# Patient Record
Sex: MALE | Race: WHITE | NOT HISPANIC OR LATINO | Employment: OTHER | URBAN - METROPOLITAN AREA
[De-identification: names, ages, dates, MRNs, and addresses within clinical notes are randomized per-mention and may not be internally consistent; named-entity substitution may affect disease eponyms.]

---

## 2018-06-24 ENCOUNTER — HOSPITAL ENCOUNTER (EMERGENCY)
Facility: HOSPITAL | Age: 33
Discharge: HOME | End: 2018-06-24
Attending: EMERGENCY MEDICINE
Payer: MEDICAID

## 2018-06-24 VITALS
HEART RATE: 81 BPM | TEMPERATURE: 99 F | OXYGEN SATURATION: 98 % | BODY MASS INDEX: 30.36 KG/M2 | WEIGHT: 205 LBS | HEIGHT: 69 IN | RESPIRATION RATE: 16 BRPM | SYSTOLIC BLOOD PRESSURE: 145 MMHG | DIASTOLIC BLOOD PRESSURE: 92 MMHG

## 2018-06-24 DIAGNOSIS — K08.89 PAIN, DENTAL: Primary | ICD-10-CM

## 2018-06-24 PROCEDURE — 99283 EMERGENCY DEPT VISIT LOW MDM: CPT

## 2018-06-24 PROCEDURE — 25000003 PHARM REV CODE 250: Performed by: PHYSICIAN ASSISTANT

## 2018-06-24 RX ORDER — TRAMADOL HYDROCHLORIDE 50 MG/1
50 TABLET ORAL EVERY 8 HOURS PRN
Qty: 5 TABLET | Refills: 0 | Status: SHIPPED | OUTPATIENT
Start: 2018-06-24 | End: 2018-06-26

## 2018-06-24 RX ORDER — HYDROCODONE BITARTRATE AND ACETAMINOPHEN 5; 325 MG/1; MG/1
1 TABLET ORAL EVERY 6 HOURS PRN
COMMUNITY

## 2018-06-24 RX ORDER — DEXTROAMPHETAMINE SACCHARATE, AMPHETAMINE ASPARTATE MONOHYDRATE, DEXTROAMPHETAMINE SULFATE AND AMPHETAMINE SULFATE 7.5; 7.5; 7.5; 7.5 MG/1; MG/1; MG/1; MG/1
30 CAPSULE, EXTENDED RELEASE ORAL EVERY MORNING
COMMUNITY

## 2018-06-24 RX ORDER — HYDROCODONE BITARTRATE AND ACETAMINOPHEN 5; 325 MG/1; MG/1
1 TABLET ORAL
Status: COMPLETED | OUTPATIENT
Start: 2018-06-24 | End: 2018-06-24

## 2018-06-24 RX ADMIN — HYDROCODONE BITARTRATE AND ACETAMINOPHEN 1 TABLET: 5; 325 TABLET ORAL at 12:06

## 2018-06-24 NOTE — ED NOTES
Patient presents to ed with c/o dental pain. Reports recent dental work on upper right jaw. Was seen at urgent care on Friday for similar symptoms and was given NORCo and antibiotics. No obvious abscess noted. Mild swelling noted to right side of face.

## 2018-06-24 NOTE — ED PROVIDER NOTES
Encounter Date: 6/24/2018       History     Chief Complaint   Patient presents with    Dental Problem     Reports dental abscess to R side, seen at  and given [place] and Abx, reports pain remians and still swollen     32 yo male presents to the ED complaining of right sided dental pain x 1 week. Patient was seen at  2 days ago and given Augmentin and norco and instructed to follow up with his dentist. Patient states the pain has not improved. States he has radiation of pain to his right ear and jaw. Patient states he has hx of tooth decay. Denies fever, sore throat.       The history is provided by the patient. No  was used.     Review of patient's allergies indicates:  No Known Allergies  Past Medical History:   Diagnosis Date    ADHD      History reviewed. No pertinent surgical history.  History reviewed. No pertinent family history.  Social History   Substance Use Topics    Smoking status: Never Smoker    Smokeless tobacco: Never Used    Alcohol use Not on file     Review of Systems   Constitutional: Negative for appetite change and fever.   HENT: Positive for dental problem. Negative for sore throat and trouble swallowing.    Respiratory: Negative for shortness of breath.    Cardiovascular: Negative for chest pain.   Gastrointestinal: Negative for nausea.   Genitourinary: Negative for dysuria.   Musculoskeletal: Negative for back pain.   Skin: Negative for rash.   Neurological: Negative for weakness and headaches.   Hematological: Does not bruise/bleed easily.   Psychiatric/Behavioral: Negative for confusion.   All other systems reviewed and are negative.      Physical Exam     Initial Vitals   BP Pulse Resp Temp SpO2   06/24/18 1229 06/24/18 1229 06/24/18 1229 06/24/18 1407 06/24/18 1229   (!) 145/92 81 16 98.6 °F (37 °C) 98 %      MAP       --                Physical Exam    Nursing note and vitals reviewed.  Constitutional: He appears well-developed and well-nourished. No distress.    HENT:   Head: Normocephalic and atraumatic.   Nose: Nose normal.   Mouth/Throat: Oropharynx is clear and moist and mucous membranes are normal. Abnormal dentition. No dental abscesses.       Chipped, decaying tooth. Non TTP, no swelling or abscess noted   Eyes: Conjunctivae are normal.   Neck: Normal range of motion.   Cardiovascular: Normal rate.   Pulmonary/Chest: Effort normal.   Musculoskeletal: Normal range of motion.   Neurological: He is alert and oriented to person, place, and time.   Skin: Skin is warm and dry.   Psychiatric: He has a normal mood and affect. His speech is normal and behavior is normal. Judgment and thought content normal. Cognition and memory are normal.         ED Course   Procedures  Labs Reviewed - No data to display       Imaging Results    None          Medical Decision Making:   Initial Assessment:   34 yo male presents to the ED complaining of right sided dental pain x 1 week. Patient was seen at  2 days ago and given Augmentin and norco and instructed to follow up with his dentist. Patient states the pain has not improved. States he has radiation of pain to his right ear and jaw. Patient states he has hx of tooth decay. Denies fever, sore throat.   Physical exam reveals a chipped decaying upper right tooth without signs of infection or abscess.  Afebrile.  Nontender to palpation  Differential Diagnosis:   Tooth decay, dental caries, dental abscess  ED Management:  Patient given [place] in ED.  He will be discharged with prescription for tramadol written by Dr. Austin and instructed to continue taking his antibiotics and follow up with his dentist tomorrow morning.                      Clinical Impression:   The encounter diagnosis was Pain, dental.                             Marietta Sol PA-C  06/24/18 2056